# Patient Record
Sex: MALE | Race: WHITE | HISPANIC OR LATINO | URBAN - METROPOLITAN AREA
[De-identification: names, ages, dates, MRNs, and addresses within clinical notes are randomized per-mention and may not be internally consistent; named-entity substitution may affect disease eponyms.]

---

## 2017-12-16 ENCOUNTER — EMERGENCY (EMERGENCY)
Facility: HOSPITAL | Age: 42
LOS: 1 days | Discharge: HOME | End: 2017-12-16
Admitting: EMERGENCY MEDICINE
Payer: COMMERCIAL

## 2017-12-16 VITALS
RESPIRATION RATE: 22 BRPM | OXYGEN SATURATION: 100 % | DIASTOLIC BLOOD PRESSURE: 93 MMHG | SYSTOLIC BLOOD PRESSURE: 125 MMHG | TEMPERATURE: 98 F | HEART RATE: 96 BPM

## 2017-12-16 PROCEDURE — 71020: CPT | Mod: 26

## 2017-12-16 PROCEDURE — 99285 EMERGENCY DEPT VISIT HI MDM: CPT

## 2017-12-16 RX ORDER — IPRATROPIUM/ALBUTEROL SULFATE 18-103MCG
3 AEROSOL WITH ADAPTER (GRAM) INHALATION ONCE
Qty: 0 | Refills: 0 | Status: COMPLETED | OUTPATIENT
Start: 2017-12-16 | End: 2017-12-16

## 2017-12-16 RX ORDER — ALBUTEROL 90 UG/1
2 AEROSOL, METERED ORAL
Qty: 1 | Refills: 0 | OUTPATIENT
Start: 2017-12-16 | End: 2017-12-22

## 2017-12-16 RX ADMIN — Medication 3 MILLILITER(S): at 20:32

## 2017-12-16 RX ADMIN — Medication 3 MILLILITER(S): at 21:35

## 2017-12-16 RX ADMIN — Medication 3 MILLILITER(S): at 21:43

## 2017-12-16 NOTE — ED ADULT TRIAGE NOTE - CHIEF COMPLAINT QUOTE
Pt arrives to triage c/o SOB, hx asthma as a child not on any medications.  Pt reports ran after a city bus, felt tightening in chest following incident for 2hrs, not improved w/ rest. Denies CP/fevers/chills. Tachypneic RRate 22, o2sat 100%, speaking in full sentences.  Given duoneb in triage - states improvement.

## 2017-12-16 NOTE — ED PROVIDER NOTE - CHPI ED SYMPTOMS NEG
no chills/no numbness/no fever/no tingling/no nausea/no decreased eating/drinking/no pain/no weakness/no vomiting/no dizziness

## 2017-12-16 NOTE — ED PROVIDER NOTE - OBJECTIVE STATEMENT
41 yo M pmhx of asthma (has not had inhaler in 1 year, reports intermittent wheezing) here for cough, wheezing x today. Pt states the past few days he has felt like he has had a cough with "phlegm in his throat", today he ran after the bus and noted that he was wheezing after. Came to ED for eval as he does not have inhaler or meds at home. Given one duoneb in triage and felt relief. Otherwise without complaints. Reports smoking cigarettes, denies other drug use. Reports he works out and runs regularly at the gym without difficulty but notices since weather has gotten cold he has felt "like asthma". Denies fever chills vomiting CP SOB weakness HA dizziness numbness tingling rash. Denies fhx or personal hx of cardiac disease.

## 2017-12-16 NOTE — ED PROVIDER NOTE - PLAN OF CARE
Use albuterol 2 puffs every 4 hours as needed for cough/wheezing. Follow up with your PMD. Rest, drink plenty of fluids.  Advance activity as tolerated.  Continue all previously prescribed medications as directed. You can use motrin 600mg every 6-8 hours for pain or fever, and/or Tylenol 650 mg every 4 hours for pain/fever. Follow up with your primary care physician in 48-72 hours- bring copies of your results.  Return to the emergency department for chest pain, shortness of breath, dizziness, or worsening, concerning, or persistent symptoms.

## 2017-12-16 NOTE — ED PROVIDER NOTE - MEDICAL DECISION MAKING DETAILS
43 yo M here for cough, wheezing, asthma symptoms after running outside. No neb or MDI at home. Otherwise well. No risk factor for cardiac disease. Pt has diffuse wheezing on exam, improved with duoneb. PLAN: finish 2 more duonebs, CXR, albuterol MDI to pharmacy.

## 2017-12-16 NOTE — ED PROVIDER NOTE - PROGRESS NOTE DETAILS
BRENDA Hooper: pt feels improved, ctabl on exam after 3 back to back duonebs, XR prelim clear, will dc with albuterol MDI. follow up with pmd.

## 2017-12-16 NOTE — ED PROVIDER NOTE - CARE PLAN
Principal Discharge DX:	Asthma exacerbation Principal Discharge DX:	Asthma exacerbation  Instructions for follow-up, activity and diet:	Use albuterol 2 puffs every 4 hours as needed for cough/wheezing. Follow up with your PMD. Rest, drink plenty of fluids.  Advance activity as tolerated.  Continue all previously prescribed medications as directed. You can use motrin 600mg every 6-8 hours for pain or fever, and/or Tylenol 650 mg every 4 hours for pain/fever. Follow up with your primary care physician in 48-72 hours- bring copies of your results.  Return to the emergency department for chest pain, shortness of breath, dizziness, or worsening, concerning, or persistent symptoms.

## 2020-08-22 ENCOUNTER — EMERGENCY (EMERGENCY)
Facility: HOSPITAL | Age: 45
LOS: 1 days | Discharge: HOME | End: 2020-08-22
Attending: EMERGENCY MEDICINE | Admitting: EMERGENCY MEDICINE
Payer: COMMERCIAL

## 2020-08-22 VITALS
HEART RATE: 78 BPM | OXYGEN SATURATION: 100 % | RESPIRATION RATE: 16 BRPM | TEMPERATURE: 98 F | SYSTOLIC BLOOD PRESSURE: 153 MMHG | DIASTOLIC BLOOD PRESSURE: 94 MMHG

## 2020-08-22 PROBLEM — J45.909 UNSPECIFIED ASTHMA, UNCOMPLICATED: Chronic | Status: ACTIVE | Noted: 2017-12-16

## 2020-08-22 LAB
APPEARANCE UR: CLEAR — SIGNIFICANT CHANGE UP
BILIRUB UR-MCNC: NEGATIVE — SIGNIFICANT CHANGE UP
BLOOD UR QL VISUAL: SIGNIFICANT CHANGE UP
COLOR SPEC: SIGNIFICANT CHANGE UP
GLUCOSE UR-MCNC: NEGATIVE — SIGNIFICANT CHANGE UP
KETONES UR-MCNC: NEGATIVE — SIGNIFICANT CHANGE UP
LEUKOCYTE ESTERASE UR-ACNC: NEGATIVE — SIGNIFICANT CHANGE UP
NITRITE UR-MCNC: NEGATIVE — SIGNIFICANT CHANGE UP
PH UR: 6 — SIGNIFICANT CHANGE UP (ref 5–8)
PROT UR-MCNC: NEGATIVE — SIGNIFICANT CHANGE UP
SP GR SPEC: 1.02 — SIGNIFICANT CHANGE UP (ref 1–1.04)
UROBILINOGEN FLD QL: NORMAL — SIGNIFICANT CHANGE UP

## 2020-08-22 PROCEDURE — 99283 EMERGENCY DEPT VISIT LOW MDM: CPT

## 2020-08-22 NOTE — ED PROVIDER NOTE - PATIENT PORTAL LINK FT
You can access the FollowMyHealth Patient Portal offered by [facility] by registering at the following website: [email or web address]. By joining [employer]’s FollowMyHealth portal, you will also be able to view your health information using other applications (apps) compatible with our system.

## 2020-08-22 NOTE — ED PROVIDER NOTE - OBJECTIVE STATEMENT
45 yo M no sig pmh presents with urinary urgency x1 week.  Patient states he feels penile discomfort after voiding.  Denies pain during urination, hematuria, penile discharge, or abdominal pain.  Reports diarrhea 1 week ago which is now resolved.  Had episode where right spermatic cord was painful and tender 3 days ago, now resolved. No fevers, chills, nausea, vomiting, trauma.  Sexually active with one female partner (wife), denies insertive anal sex, denies history of STI.

## 2020-08-22 NOTE — ED ADULT NURSE NOTE - OBJECTIVE STATEMENT
44 year old male presents with c/o burning while urinating x 1 week pt states he doesn't feel like he is emptying his bladder  Urine sent for u/a, cx and chlamydia Plan of care discussed with pt

## 2020-08-22 NOTE — ED PROVIDER NOTE - PHYSICAL EXAMINATION
GEN:  Non-toxic appearing, non-distressed, speaking full sentences, non-diaphoretic, AAOx3  HEENT:  NCAT, neck supple, EOMI, PERRLA, sclera anicteric, no conjunctival pallor or injection, no stridor, normal voice, no tonsillar exudate, uvula midline, tympanic membranes and external auditory canals normal appearing bilaterally   CV:  regular rhythm and rate, s1/s2 audible, no murmurs, rubs or gallops, peripheral pulses 2+ and symmetric  PULM:  non-labored respirations, lungs clear to auscultation bilaterally, no wheezes, crackles or rales  ABD:  non distended, non-tender, no rebound, no guarding, negative Salvador's sign, bowel sounds normal, no cvat  MSK:  no gross deformity, non-tender extremities and joints, range of motion grossly normal appearing, no extremity edema, extremities warm and well perfused   NEURO:  AAOx3, CN II-XII intact, motor 5/5 in upper and lower extremities bilaterally, sensation grossly intact in extremities and trunk, finger to nose testing wnl, no nystagmus, negative Romberg, no pronator drift, no gait deficit  SKIN:  warm, dry, no rash or vesicles   :  uncircumcised penis.  No penile lesions, swelling, tenderness.  Testes non-swollen, non-erythematous, non-tender.  No hernia.

## 2020-08-22 NOTE — ED PROVIDER NOTE - NSFOLLOWUPINSTRUCTIONS_ED_ALL_ED_FT
1.  Please return to care for worsening pain, fever, chills, abdominal pain, testicular pain or swelling.    2.  Take ibuprofen 400 mg every 6-8 hours as needed for pain.   3.  Please follow with your pcp or urology as early as able.

## 2020-08-23 LAB
CULTURE RESULTS: NO GROWTH — SIGNIFICANT CHANGE UP
SPECIMEN SOURCE: SIGNIFICANT CHANGE UP

## 2020-08-24 LAB
C TRACH RRNA SPEC QL NAA+PROBE: SIGNIFICANT CHANGE UP
N GONORRHOEA RRNA SPEC QL NAA+PROBE: SIGNIFICANT CHANGE UP
SPECIMEN SOURCE: SIGNIFICANT CHANGE UP